# Patient Record
(demographics unavailable — no encounter records)

---

## 2025-03-31 NOTE — PHYSICAL EXAM
[Right] : right foot and ankle [Moderate] : moderate swelling of medial ankle [] : no achilles tendon tenderness

## 2025-03-31 NOTE — IMAGING
[Right] : right ankle [Weight -] : weightbearing [FreeTextEntry9] : avulsion fx cuboid [de-identified] : old appearing talus avulsion

## 2025-03-31 NOTE — HISTORY OF PRESENT ILLNESS
[7] : 7 [2] : 2 [Rest] : rest [de-identified] : Missed a step 3 days ago, rolled right ankle/foot. Has pain/swelling/discoloration [] : no [FreeTextEntry1] : right foot  [FreeTextEntry5] : right foot pain since falling down last 2 stairs on friday, inversion injury. uc visit revealed a small lateral avulsion fx of the anterior process of the calcaneus. WB win cane.  [de-identified] : none

## 2025-04-22 NOTE — HISTORY OF PRESENT ILLNESS
[7] : 7 [2] : 2 [Rest] : rest [de-identified] : 4/22/25:  Here for follow up.  Now 3 weeks s/p injury.  She has decreased hypersensitivity to touch; she has been able to WB intermittently without the boot at home to shower and get dressed. She has not started PT.  3/31/25: Missed a step 3 days ago, rolled right ankle/foot. Has pain/swelling/discoloration [] : no [FreeTextEntry1] : right foot  [FreeTextEntry5] : right foot pain since falling down last 2 stairs on friday, inversion injury. uc visit revealed a small lateral avulsion fx of the anterior process of the calcaneus. WB win cane.  [de-identified] : none

## 2025-04-22 NOTE — PHYSICAL EXAM
[Right] : right foot and ankle [Moderate] : moderate swelling of medial ankle [NL (40)] : plantar flexion 40 degrees [NL 30)] : inversion 30 degrees [NL (20)] : eversion 20 degrees [2+] : posterior tibialis pulse: 2+ [] : varicosities are not warm and well perfused [FreeTextEntry3] : swelling and ecchymosis improved [FreeTextEntry8] : mild tenderness to palpation [de-identified] : cam boot

## 2025-04-22 NOTE — DISCUSSION/SUMMARY
[de-identified] : Will start PT c/w Rest, ice, compress, elevate prn c/w CAM boot WBAT for another two weeks, then transition to lace-up with sneaker Return 4 weeks for re-evaluation

## 2025-04-22 NOTE — IMAGING
[Right] : right ankle [Weight -] : weightbearing [FreeTextEntry9] : avulsion fx cuboid [de-identified] : old appearing talus avulsion

## 2025-05-20 NOTE — PHYSICAL EXAM
[Right] : right foot and ankle [NL (40)] : plantar flexion 40 degrees [NL 30)] : inversion 30 degrees [NL (20)] : eversion 20 degrees [2+] : posterior tibialis pulse: 2+ [] : Sensation present to light touch in all distributions [FreeTextEntry8] : mild tenderness to palpation [de-identified] : cam boot

## 2025-05-20 NOTE — HISTORY OF PRESENT ILLNESS
[7] : 7 [2] : 2 [Rest] : rest [de-identified] : 05/20/2025 Doing PT, making good progress, wearing regular shoe 4/22/25:  Here for follow up.  Now 3 weeks s/p injury.  She has decreased hypersensitivity to touch; she has been able to WB intermittently without the boot at home to shower and get dressed. She has not started PT.  3/31/25: Missed a step 3 days ago, rolled right ankle/foot. Has pain/swelling/discoloration [] : no [FreeTextEntry1] : right foot  [FreeTextEntry5] : right foot pain since falling down last 2 stairs on friday, inversion injury. uc visit revealed a small lateral avulsion fx of the anterior process of the calcaneus. WB win cane.  [de-identified] : none